# Patient Record
Sex: MALE | Race: WHITE | NOT HISPANIC OR LATINO | Employment: FULL TIME | URBAN - NONMETROPOLITAN AREA
[De-identification: names, ages, dates, MRNs, and addresses within clinical notes are randomized per-mention and may not be internally consistent; named-entity substitution may affect disease eponyms.]

---

## 2018-07-09 ENCOUNTER — HOSPITAL ENCOUNTER (OUTPATIENT)
Facility: HOSPITAL | Age: 33
Setting detail: OBSERVATION
Discharge: HOME | End: 2018-07-10
Attending: INTERNAL MEDICINE | Admitting: INTERNAL MEDICINE

## 2018-07-09 DIAGNOSIS — I48.91 ATRIAL FIBRILLATION, UNSPECIFIED TYPE (HCC): ICD-10-CM

## 2018-07-09 DIAGNOSIS — G47.33 OSA (OBSTRUCTIVE SLEEP APNEA): Primary | ICD-10-CM

## 2018-07-09 LAB
CHOLEST SERPL-MCNC: 130 MG/DL (ref 0–199)
HBA1C MFR BLD: 5.7 % (ref 3–6)
HDLC SERPL-MCNC: 32 MG/DL (ref 40–60)
HIGH SENSITIVE C-REACTIVE PROTEIN MG/L: 12.03 MG/L (ref 1–3)
LDLC SERPL CALC-MCNC: 78 MG/DL (ref 0–99)
LDLC/HDLC SERPL: 2.45 {RATIO}
T4 FREE SERPL-MCNC: 1.5 NG/DL (ref 0.78–2.19)
TRIGL SERPL-MCNC: 98 MG/DL
TSH SERPL DL<=0.05 MIU/L-ACNC: 1.84 MIU/ML (ref 0.47–4.68)
VLDLC SERPL-MCNC: 19.6 MG/DL

## 2018-07-09 PROCEDURE — 25010000002 MIDAZOLAM PER 1 MG: Performed by: INTERNAL MEDICINE

## 2018-07-09 PROCEDURE — 93005 ELECTROCARDIOGRAM TRACING: CPT | Performed by: INTERNAL MEDICINE

## 2018-07-09 PROCEDURE — 83036 HEMOGLOBIN GLYCOSYLATED A1C: CPT | Performed by: INTERNAL MEDICINE

## 2018-07-09 PROCEDURE — 84443 ASSAY THYROID STIM HORMONE: CPT | Performed by: INTERNAL MEDICINE

## 2018-07-09 PROCEDURE — 94660 CPAP INITIATION&MGMT: CPT

## 2018-07-09 PROCEDURE — G0379 DIRECT REFER HOSPITAL OBSERV: HCPCS

## 2018-07-09 PROCEDURE — 94799 UNLISTED PULMONARY SVC/PX: CPT

## 2018-07-09 PROCEDURE — 84439 ASSAY OF FREE THYROXINE: CPT | Performed by: INTERNAL MEDICINE

## 2018-07-09 PROCEDURE — G0378 HOSPITAL OBSERVATION PER HR: HCPCS

## 2018-07-09 PROCEDURE — 80061 LIPID PANEL: CPT | Performed by: INTERNAL MEDICINE

## 2018-07-09 PROCEDURE — 25010000002 MAGNESIUM SULFATE 2 GM/50ML SOLUTION: Performed by: INTERNAL MEDICINE

## 2018-07-09 PROCEDURE — 86141 C-REACTIVE PROTEIN HS: CPT | Performed by: INTERNAL MEDICINE

## 2018-07-09 RX ORDER — METOPROLOL TARTRATE 5 MG/5ML
INJECTION INTRAVENOUS EVERY 6 HOURS
Status: CANCELLED | OUTPATIENT
Start: 2018-07-09

## 2018-07-09 RX ORDER — SPIRONOLACTONE 25 MG/1
25 TABLET ORAL DAILY
Status: DISCONTINUED | OUTPATIENT
Start: 2018-07-09 | End: 2018-07-10 | Stop reason: HOSPADM

## 2018-07-09 RX ORDER — LISINOPRIL 10 MG/1
10 TABLET ORAL
Status: DISCONTINUED | OUTPATIENT
Start: 2018-07-09 | End: 2018-07-10 | Stop reason: HOSPADM

## 2018-07-09 RX ORDER — MAGNESIUM SULFATE HEPTAHYDRATE 40 MG/ML
2 INJECTION, SOLUTION INTRAVENOUS ONCE
Status: COMPLETED | OUTPATIENT
Start: 2018-07-09 | End: 2018-07-09

## 2018-07-09 RX ORDER — LOSARTAN POTASSIUM 25 MG/1
25 TABLET ORAL DAILY
COMMUNITY
End: 2018-07-10 | Stop reason: HOSPADM

## 2018-07-09 RX ORDER — ESOMEPRAZOLE MAGNESIUM 40 MG/1
40 CAPSULE, DELAYED RELEASE ORAL
COMMUNITY

## 2018-07-09 RX ORDER — MIDAZOLAM HYDROCHLORIDE 1 MG/ML
5 INJECTION INTRAMUSCULAR; INTRAVENOUS ONCE
Status: COMPLETED | OUTPATIENT
Start: 2018-07-09 | End: 2018-07-09

## 2018-07-09 RX ORDER — METOPROLOL SUCCINATE 50 MG/1
50 TABLET, EXTENDED RELEASE ORAL
Status: DISCONTINUED | OUTPATIENT
Start: 2018-07-09 | End: 2018-07-10 | Stop reason: HOSPADM

## 2018-07-09 RX ORDER — METOPROLOL TARTRATE 5 MG/5ML
5 INJECTION INTRAVENOUS ONCE
Status: COMPLETED | OUTPATIENT
Start: 2018-07-09 | End: 2018-07-09

## 2018-07-09 RX ORDER — MEPERIDINE HYDROCHLORIDE 50 MG/ML
50 INJECTION INTRAMUSCULAR; INTRAVENOUS; SUBCUTANEOUS ONCE
Status: COMPLETED | OUTPATIENT
Start: 2018-07-09 | End: 2018-07-09

## 2018-07-09 RX ADMIN — METOPROLOL SUCCINATE 50 MG: 50 TABLET, EXTENDED RELEASE ORAL at 20:16

## 2018-07-09 RX ADMIN — METOROPROLOL TARTRATE 5 MG: 5 INJECTION, SOLUTION INTRAVENOUS at 18:25

## 2018-07-09 RX ADMIN — LISINOPRIL 10 MG: 10 TABLET ORAL at 20:16

## 2018-07-09 RX ADMIN — SPIRONOLACTONE 25 MG: 25 TABLET ORAL at 20:16

## 2018-07-09 RX ADMIN — MAGNESIUM SULFATE HEPTAHYDRATE 2 G: 40 INJECTION, SOLUTION INTRAVENOUS at 18:33

## 2018-07-09 RX ADMIN — MEPERIDINE HYDROCHLORIDE 50 MG: 50 INJECTION, SOLUTION INTRAMUSCULAR; INTRAVENOUS; SUBCUTANEOUS at 18:37

## 2018-07-09 RX ADMIN — METOROPROLOL TARTRATE 5 MG: 5 INJECTION, SOLUTION INTRAVENOUS at 18:33

## 2018-07-09 RX ADMIN — MIDAZOLAM HYDROCHLORIDE 5 MG: 1 INJECTION, SOLUTION INTRAMUSCULAR; INTRAVENOUS at 18:36

## 2018-07-09 NOTE — H&P
Arslan Garcia MD      Patient Care Team:  Arslan Garcia MD as PCP - General (Family Medicine)      History of present illness:    General gentleman who is been feeling weak for the last 2-3 weeks time.  Has been short of breath in general.  Has no energy to do things.  Yesterday started feeling severe chest pains which were intermittent in nature this morning the pains got very severe hence came into the emergency room at [facility].  There he was noted to rule out for myocardial infarction nevertheless what is in atrial flutter with rapid ventricular rate.  He did not respond to Cardizem therapy as well as beta blocker therapy was put on amiodarone drip and transferred over.    Review of Systems   Pertinent items are noted in HPI  Review of Systems      History  #1 baseline EKG sinus rhythm -  msecs rate 64 bpm no st changes   #2 Htn   #3 Obesity morbid  #4 Sleep apnea high probability - snores a lot     Personal history :   Non smoker . No alcohol intake . No drugs     ROS:   No cough cold fever chills nausea vomiting diarrhea and abdominal pain loss of consciousness PND orthopnea stroke weakness joint swelling distal to the symptoms are negative.    Family history:    Noncontributory.    History reviewed. No pertinent surgical history., No family history on file., Social History   Substance Use Topics   • Smoking status: Never Smoker   • Smokeless tobacco: Never Used   • Alcohol use Not on file   , Prescriptions Prior to Admission   Medication Sig Dispense Refill Last Dose   • esomeprazole (nexIUM) 40 MG capsule Take 40 mg by mouth Every Morning Before Breakfast.   Past Week at Unknown time   • losartan (COZAAR) 25 MG tablet Take 25 mg by mouth Daily.   More than a month at Unknown time   , Scheduled Meds:    lisinopril 10 mg Oral Q24H   metoprolol succinate XL 50 mg Oral Q24H   spironolactone 25 mg Oral Daily   , Continuous Infusions:   , PRN Meds:  , Allergies:  Patient has no known  "allergies.     Objective     Vital Sign Min/Max for last 24 hours  Temp  Min: 97.6 °F (36.4 °C)  Max: 97.6 °F (36.4 °C)   BP  Min: 101/61  Max: 130/98   Pulse  Min: 128  Max: 133   Resp  Min: 14  Max: 14   SpO2  Min: 92 %  Max: 100 %   Flow (L/min)  Min: 2  Max: 2   Weight  Min: 231 kg (509 lb 4.8 oz)  Max: 231 kg (509 lb 4.8 oz)     Flowsheet Rows      First Filed Value   Admission Height  188 cm (74\") Documented at 07/09/2018 1500   Admission Weight   231 kg (509 lb 4.8 oz) Documented at 07/09/2018 1500               Physical Exam:     General Appearance:    Alert, cooperative, in no acute distress   Head:    Normocephalic, without obvious abnormality, atraumatic   Eyes:            Lids and lashes normal, conjunctivae and sclerae normal, no   icterus, no pallor, corneas clear, PERRLA   Ears:    Ears appear intact with no abnormalities noted   Throat:   No oral lesions, no thrush, oral mucosa moist   Neck:   No adenopathy, supple, trachea midline, no thyromegaly, no   carotid bruit, no JVD   Back:     No kyphosis present, no scoliosis present, no skin lesions,      erythema or scars, no tenderness to percussion or                   palpation,   range of motion normal   Lungs:     Clear to auscultation,respirations regular, even and                  unlabored    Heart:    Regular rhythm and normal rate, normal S1 and S2, no            murmur, no gallop, no rub, no click   Chest Wall:    No abnormalities observed   Abdomen:     Normal bowel sounds, no masses, no organomegaly, soft        non-tender, non-distended, no guarding, no rebound                tenderness   Rectal:     Deferred   Extremities:   Moves all extremities well, no edema, no cyanosis, no             redness   Pulses:   Pulses palpable and equal bilaterally   Skin:   No bleeding, bruising or rash   Lymph nodes:   No palpable adenopathy   Neurologic:   Cranial nerves 2 - 12 grossly intact, sensation intact, DTR       present and equal bilaterally "       Results Review:   I reviewed the patient's new clinical results.  EKG atrial flutter with rapid ventricular rate.    LAB DATA :           No results found for: WBC, RBC, HGB, HCT, MCV, MCH, MCHC, RDW, RDWSD, MPV, PLT, NEUTRORELPCT, LYMPHORELPCT, MONORELPCT, EOSRELPCT, BASORELPCT, AUTOIGPER, NEUTROABS, LYMPHSABS, MONOSABS, EOSABS, BASOSABS, AUTOIGNUM, NRBC    No results found for: GLUCOSE, BUN, CREATININE, EGFRIFNONA, EGFRIFAFRI, BCR, CO2, CALCIUM, PROTENTOTREF, ALBUMIN, LABIL2, AST, ALT    No results found for: CKTOTAL, CKMB, CKMBINDEX, TROPONINI, TROPONINT    No results found for: DDIMER    No results found for: SITE, ALLENTEST, PHART, TRX8HCU, PO2ART, YGR1GLB, BASEEXCESS, F5RWLVOB, HGBBG, HCTABG, OXYHEMOGLOBI, METHHGBN, CARBOXYHGB, CO2CT, BAROMETRIC, MODALITY, FIO2  No results found for: HGBA1C      No results found for: LIPASE    IMAGING DATA:     No results found.    Assessment/Plan     DIAGNOSIS   #1 atrial flutter with rapid ventricular rate: Patient is presented with atrial flutter with rapid ventricular rate.  He has failed beta blockers and calcium channel blockers therapy at this time.  We will proceed with a FREEMAN guided cardioversion.  This in view of the fact that the patient has had symptoms for over 2 weeks now.  And a bedside echo reveals diminished LV systolic function.    #2 anticoagulation therapy: Patient's chads 2 score appears to be one.  Will obtain blood work to evaluate for any other risk factors solid if any change in the chads score does occur.    #3 morbid obesity: Will need to be addressed.    #4 sleep apnea syndrome high probability.  We will arrange for a sleep study on an outpatient basis.    Estimated length of stay: One to 2 days.        Active Problems:    Atrial fibrillation (CMS/HCC)          I discussed the patients findings and my recommendations with patient    Duane Gracia MD  07/09/18  7:30 PM

## 2018-07-10 ENCOUNTER — APPOINTMENT (OUTPATIENT)
Dept: CARDIOLOGY | Facility: HOSPITAL | Age: 33
End: 2018-07-10
Attending: INTERNAL MEDICINE

## 2018-07-10 VITALS
DIASTOLIC BLOOD PRESSURE: 90 MMHG | WEIGHT: 315 LBS | RESPIRATION RATE: 18 BRPM | SYSTOLIC BLOOD PRESSURE: 138 MMHG | HEART RATE: 79 BPM | BODY MASS INDEX: 40.43 KG/M2 | TEMPERATURE: 98.5 F | OXYGEN SATURATION: 95 % | HEIGHT: 74 IN

## 2018-07-10 PROBLEM — I10 ESSENTIAL HYPERTENSION: Chronic | Status: ACTIVE | Noted: 2018-07-10

## 2018-07-10 PROBLEM — IMO0001 CLASS 3 OBESITY IN ADULT: Chronic | Status: ACTIVE | Noted: 2018-07-10

## 2018-07-10 PROBLEM — G47.37 CENTRAL SLEEP APNEA DUE TO MEDICAL CONDITION: Chronic | Status: ACTIVE | Noted: 2018-07-10

## 2018-07-10 LAB
ALBUMIN SERPL-MCNC: 3.9 G/DL (ref 3.5–5)
ALBUMIN/GLOB SERPL: 1.1 G/DL (ref 1–2)
ALP SERPL-CCNC: 68 U/L (ref 38–126)
ALT SERPL W P-5'-P-CCNC: 87 U/L (ref 13–69)
ANION GAP SERPL CALCULATED.3IONS-SCNC: 12.6 MMOL/L (ref 10–20)
AST SERPL-CCNC: 52 U/L (ref 15–46)
BASOPHILS # BLD AUTO: 0.07 10*3/MM3 (ref 0–0.2)
BASOPHILS NFR BLD AUTO: 0.8 % (ref 0–2.5)
BH CV ECHO MEAS - % IVS THICK: 47.8 %
BH CV ECHO MEAS - % LVPW THICK: 9.5 %
BH CV ECHO MEAS - AO ACC SLOPE: 898.3 CM/SEC^2
BH CV ECHO MEAS - AO ACC TIME: 0.12 SEC
BH CV ECHO MEAS - AO ROOT AREA (BSA CORRECTED): 1.1
BH CV ECHO MEAS - AO ROOT AREA: 9.3 CM^2
BH CV ECHO MEAS - AO ROOT DIAM: 3.4 CM
BH CV ECHO MEAS - BSA(HAYCOCK): 3.6 M^2
BH CV ECHO MEAS - BSA: 3.2 M^2
BH CV ECHO MEAS - BZI_BMI: 65.4 KILOGRAMS/M^2
BH CV ECHO MEAS - BZI_METRIC_HEIGHT: 188 CM
BH CV ECHO MEAS - BZI_METRIC_WEIGHT: 230.9 KG
BH CV ECHO MEAS - CONTRAST EF 4CH: 47.9 ML/M^2
BH CV ECHO MEAS - EDV(CUBED): 311.7 ML
BH CV ECHO MEAS - EDV(MOD-SP4): 140 ML
BH CV ECHO MEAS - EDV(TEICH): 237.7 ML
BH CV ECHO MEAS - EF(CUBED): 63 %
BH CV ECHO MEAS - EF(MOD-SP4): 47.9 %
BH CV ECHO MEAS - EF(TEICH): 53.3 %
BH CV ECHO MEAS - ESV(CUBED): 115.3 ML
BH CV ECHO MEAS - ESV(MOD-SP4): 73 ML
BH CV ECHO MEAS - ESV(TEICH): 111.1 ML
BH CV ECHO MEAS - FS: 28.2 %
BH CV ECHO MEAS - IVS/LVPW: 1.1
BH CV ECHO MEAS - IVSD: 1.3 CM
BH CV ECHO MEAS - IVSS: 1.9 CM
BH CV ECHO MEAS - LA DIMENSION: 5.5 CM
BH CV ECHO MEAS - LA/AO: 1.6
BH CV ECHO MEAS - LV DIASTOLIC VOL/BSA (35-75): 43.3 ML/M^2
BH CV ECHO MEAS - LV MASS(C)D: 401 GRAMS
BH CV ECHO MEAS - LV MASS(C)DI: 124.1 GRAMS/M^2
BH CV ECHO MEAS - LV MASS(C)S: 342.6 GRAMS
BH CV ECHO MEAS - LV MASS(C)SI: 106 GRAMS/M^2
BH CV ECHO MEAS - LV MAX PG: 2.5 MMHG
BH CV ECHO MEAS - LV MEAN PG: 1.8 MMHG
BH CV ECHO MEAS - LV SYSTOLIC VOL/BSA (12-30): 22.6 ML/M^2
BH CV ECHO MEAS - LV V1 MAX: 74.7 CM/SEC
BH CV ECHO MEAS - LV V1 MEAN: 56.4 CM/SEC
BH CV ECHO MEAS - LV V1 VTI: 18.2 CM
BH CV ECHO MEAS - LVIDD: 6.8 CM
BH CV ECHO MEAS - LVIDS: 4.9 CM
BH CV ECHO MEAS - LVLD AP4: 9.4 CM
BH CV ECHO MEAS - LVLS AP4: 7 CM
BH CV ECHO MEAS - LVOT AREA (M): 5.3 CM^2
BH CV ECHO MEAS - LVOT AREA: 5.3 CM^2
BH CV ECHO MEAS - LVOT DIAM: 2.6 CM
BH CV ECHO MEAS - LVPWD: 1.2 CM
BH CV ECHO MEAS - LVPWS: 1.3 CM
BH CV ECHO MEAS - MV A MAX VEL: 52.8 CM/SEC
BH CV ECHO MEAS - MV E MAX VEL: 115.5 CM/SEC
BH CV ECHO MEAS - MV E/A: 2.2
BH CV ECHO MEAS - MV MAX PG: 4.9 MMHG
BH CV ECHO MEAS - MV MEAN PG: 1.3 MMHG
BH CV ECHO MEAS - MV V2 MAX: 110.6 CM/SEC
BH CV ECHO MEAS - MV V2 MEAN: 49 CM/SEC
BH CV ECHO MEAS - MV V2 VTI: 19 CM
BH CV ECHO MEAS - MVA(VTI): 5.1 CM^2
BH CV ECHO MEAS - PA ACC SLOPE: 652.6 CM/SEC^2
BH CV ECHO MEAS - PA ACC TIME: 0.1 SEC
BH CV ECHO MEAS - PA MAX PG: 1.5 MMHG
BH CV ECHO MEAS - PA MEAN PG: 0.82 MMHG
BH CV ECHO MEAS - PA PR(ACCEL): 33.1 MMHG
BH CV ECHO MEAS - PA V2 MAX: 60.2 CM/SEC
BH CV ECHO MEAS - PA V2 MEAN: 42.8 CM/SEC
BH CV ECHO MEAS - PA V2 VTI: 14.2 CM
BH CV ECHO MEAS - SI(CUBED): 60.8 ML/M^2
BH CV ECHO MEAS - SI(LVOT): 30.1 ML/M^2
BH CV ECHO MEAS - SI(MOD-SP4): 20.7 ML/M^2
BH CV ECHO MEAS - SI(TEICH): 39.2 ML/M^2
BH CV ECHO MEAS - SV(CUBED): 196.4 ML
BH CV ECHO MEAS - SV(LVOT): 97.3 ML
BH CV ECHO MEAS - SV(MOD-SP4): 67 ML
BH CV ECHO MEAS - SV(TEICH): 126.6 ML
BH CV ECHO MEAS - TR MAX VEL: 209.7 CM/SEC
BILIRUB SERPL-MCNC: 1.1 MG/DL (ref 0.2–1.3)
BUN BLD-MCNC: 21 MG/DL (ref 7–20)
BUN/CREAT SERPL: 17.5 (ref 6.3–21.9)
CALCIUM SPEC-SCNC: 9.5 MG/DL (ref 8.4–10.2)
CHLORIDE SERPL-SCNC: 103 MMOL/L (ref 98–107)
CO2 SERPL-SCNC: 30 MMOL/L (ref 26–30)
CREAT BLD-MCNC: 1.2 MG/DL (ref 0.6–1.3)
DEPRECATED RDW RBC AUTO: 47.3 FL (ref 37–54)
EOSINOPHIL # BLD AUTO: 0.26 10*3/MM3 (ref 0–0.7)
EOSINOPHIL NFR BLD AUTO: 2.8 % (ref 0–7)
ERYTHROCYTE [DISTWIDTH] IN BLOOD BY AUTOMATED COUNT: 14.2 % (ref 11.5–14.5)
GFR SERPL CREATININE-BSD FRML MDRD: 71 ML/MIN/1.73
GLOBULIN UR ELPH-MCNC: 3.5 GM/DL
GLUCOSE BLD-MCNC: 115 MG/DL (ref 74–98)
HCT VFR BLD AUTO: 45 % (ref 42–52)
HGB BLD-MCNC: 14.1 G/DL (ref 14–18)
IMM GRANULOCYTES # BLD: 0.05 10*3/MM3 (ref 0–0.06)
IMM GRANULOCYTES NFR BLD: 0.5 % (ref 0–0.6)
LYMPHOCYTES # BLD AUTO: 2.25 10*3/MM3 (ref 0.6–3.4)
LYMPHOCYTES NFR BLD AUTO: 24.5 % (ref 10–50)
MAXIMAL PREDICTED HEART RATE: 189 BPM
MCH RBC QN AUTO: 28.4 PG (ref 27–31)
MCHC RBC AUTO-ENTMCNC: 31.3 G/DL (ref 30–37)
MCV RBC AUTO: 90.5 FL (ref 80–94)
MONOCYTES # BLD AUTO: 0.94 10*3/MM3 (ref 0–0.9)
MONOCYTES NFR BLD AUTO: 10.3 % (ref 0–12)
NEUTROPHILS # BLD AUTO: 5.6 10*3/MM3 (ref 2–6.9)
NEUTROPHILS NFR BLD AUTO: 61.1 % (ref 37–80)
NRBC BLD MANUAL-RTO: 0 /100 WBC (ref 0–0)
PLATELET # BLD AUTO: 403 10*3/MM3 (ref 130–400)
PMV BLD AUTO: 10.3 FL (ref 6–12)
POTASSIUM BLD-SCNC: 4.6 MMOL/L (ref 3.5–5.1)
PROT SERPL-MCNC: 7.4 G/DL (ref 6.3–8.2)
RBC # BLD AUTO: 4.97 10*6/MM3 (ref 4.7–6.1)
SODIUM BLD-SCNC: 141 MMOL/L (ref 137–145)
STRESS TARGET HR: 161 BPM
WBC NRBC COR # BLD: 9.17 10*3/MM3 (ref 4.8–10.8)

## 2018-07-10 PROCEDURE — 94799 UNLISTED PULMONARY SVC/PX: CPT

## 2018-07-10 PROCEDURE — 85025 COMPLETE CBC W/AUTO DIFF WBC: CPT | Performed by: INTERNAL MEDICINE

## 2018-07-10 PROCEDURE — 99244 OFF/OP CNSLTJ NEW/EST MOD 40: CPT | Performed by: INTERNAL MEDICINE

## 2018-07-10 PROCEDURE — 93306 TTE W/DOPPLER COMPLETE: CPT

## 2018-07-10 PROCEDURE — 93320 DOPPLER ECHO COMPLETE: CPT

## 2018-07-10 PROCEDURE — 93325 DOPPLER ECHO COLOR FLOW MAPG: CPT

## 2018-07-10 PROCEDURE — G0378 HOSPITAL OBSERVATION PER HR: HCPCS

## 2018-07-10 PROCEDURE — 94660 CPAP INITIATION&MGMT: CPT

## 2018-07-10 PROCEDURE — 93312 ECHO TRANSESOPHAGEAL: CPT

## 2018-07-10 PROCEDURE — 80053 COMPREHEN METABOLIC PANEL: CPT | Performed by: INTERNAL MEDICINE

## 2018-07-10 RX ORDER — METOPROLOL SUCCINATE 50 MG/1
50 TABLET, EXTENDED RELEASE ORAL
Qty: 90 TABLET | Refills: 3 | Status: SHIPPED | OUTPATIENT
Start: 2018-07-11

## 2018-07-10 RX ORDER — SPIRONOLACTONE 25 MG/1
25 TABLET ORAL DAILY
Qty: 90 TABLET | Refills: 3 | Status: SHIPPED | OUTPATIENT
Start: 2018-07-11

## 2018-07-10 RX ORDER — LISINOPRIL 10 MG/1
10 TABLET ORAL 2 TIMES DAILY
Qty: 180 TABLET | Refills: 3 | Status: SHIPPED | OUTPATIENT
Start: 2018-07-10 | End: 2022-07-27 | Stop reason: DRUGHIGH

## 2018-07-10 RX ADMIN — METOPROLOL SUCCINATE 50 MG: 50 TABLET, EXTENDED RELEASE ORAL at 08:50

## 2018-07-10 RX ADMIN — LISINOPRIL 10 MG: 10 TABLET ORAL at 08:50

## 2018-07-10 RX ADMIN — SPIRONOLACTONE 25 MG: 25 TABLET ORAL at 08:49

## 2018-07-10 NOTE — PROCEDURES
Procedure performed:    Electrical cardioversion.    Date of procedure 7/9/18.    Indication: Incessant atrial flutter with rapid ventricular rate symptomatic.    Procedure:    Patient underwent a transesophageal echocardiography.  There was no evidence for thrombus in the left atrial appendage.  Patient was shocked a single sinus rhythm with a single synchronized shock at 200 J.    Conclusions:    Successful cardioversion of atrial flutter to sinus rhythm with a single synchronized electric shock.

## 2018-07-10 NOTE — PROGRESS NOTES
Discharge Planning Assessment  [facility]     Patient Name: Baldemar Schmitt  MRN: [id number]  Today's Date: 7/10/2018    Admit Date: 7/9/2018          Discharge Needs Assessment     Row Name 07/10/18 1015       Living Environment    Lives With spouse    Current Living Arrangements home/apartment/condo    Primary Care Provided by self    Provides Primary Care For no one    Family Caregiver if Needed spouse    Quality of Family Relationships helpful;involved;supportive    Able to Return to Prior Arrangements yes       Resource/Environmental Concerns    Resource/Environmental Concerns none       Transition Planning    Patient/Family Anticipates Transition to home with family    Patient/Family Anticipated Services at Transition none    Transportation Anticipated car, drives self;family or friend will provide       Discharge Needs Assessment    Readmission Within the Last 30 Days no previous admission in last 30 days    Concerns to be Addressed no discharge needs identified;denies needs/concerns at this time    Equipment Currently Used at Home none    Anticipated Changes Related to Illness none    Equipment Needed After Discharge none    Offered/Gave Vendor List no            Discharge Plan     Row Name 07/10/18 1016       Plan    Plan Discharge Planning    Patient/Family in Agreement with Plan yes    Plan Comments SW met with pt at bedside while in the ICU for dcp. Pt lives with his wife and is independent with ADL's. Pt is employed full time. He has a PCP that he follows. He does not have an AD and info was offered. Pt does not use any home medical equipment. Pt denies any d/c needs at this time and plans on returning home later today. SW will continue to follow and assist as needed.    Final Discharge Disposition Code 01 - home or self-care        Destination     No service coordination in this encounter.      Durable Medical Equipment     No service coordination in this encounter.      Dialysis/Infusion     No service  coordination in this encounter.      Home Medical Care     No service coordination in this encounter.      Social Care     No service coordination in this encounter.                Demographic Summary    No documentation.           Functional Status     Row Name 07/10/18 1014       Functional Status    Usual Activity Tolerance good    Current Activity Tolerance good       Functional Status, IADL    Medications independent    Meal Preparation independent    Housekeeping independent    Laundry independent    Shopping independent       Mental Status Summary    Recent Changes in Mental Status/Cognitive Functioning no changes       Employment/    Employment Status employed full time    Current or Previous Occupation professional;service industry    Employment/ Comments works at [employer] ([employer]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           Abuse/Neglect    No documentation.           Legal    No documentation.           Substance Abuse    No documentation.           Patient Forms    No documentation.         KELLI Sanchez  07/10/18  10:20 AM

## 2018-07-10 NOTE — PROGRESS NOTES
Case Management Discharge Note    Final Note: Pt to d/c home later today. No needs identified.    Destination     No service has been selected for the patient.      Durable Medical Equipment     No service has been selected for the patient.      Dialysis/Infusion     No service has been selected for the patient.      Home Medical Care     No service has been selected for the patient.      Social Care     No service has been selected for the patient.        Other: Other (personal vehicle)    Final Discharge Disposition Code: 01 - home or self-care

## 2018-07-10 NOTE — PLAN OF CARE
Problem: Arrhythmia/Dysrhythmia (Symptomatic) (Adult)  Goal: Signs and Symptoms of Listed Potential Problems Will be Absent, Minimized or Managed (Arrhythmia/Dysrhythmia)  Outcome: Ongoing (interventions implemented as appropriate)   07/10/18 3360   Goal/Outcome Evaluation   Problems Assessed (Arrhythmia/Dysrhythmia) all   Problems Present (Dysrhythmia) none

## 2018-07-10 NOTE — DISCHARGE INSTR - APPOINTMENTS
Dr Arslan Garcia - [phone] (follow-up in 2 weeks)  July 23rd, 2018 @ 10 AM    Dr Gracia [phone] [address] (follow-up 4 weeks)  August 13th @ 3:45 PM  * Remember to bring Blood Pressure log and medications you are currently taking    Dr Guillermo - Pulmonologist [phone]  *If office has not called you within 1-2 weeks, call to ask about home sleep apnea study

## 2018-07-10 NOTE — CONSULTS
"Date of consultation:   July 10, 2018    Requested by:   Dr. Gracia     PCP: Arslan Garcia MD    Reason:  Obstructive sleep apnea    History of Present Illness:  32 y.o. male  who presented with atrial fibrillation/flutter and had to be cardioverted.  Upon questioning he was complaining of sleep disturbance and consultation was requested for further recommendations. Patient says that for the past few years, he has had trouble with snoring. Patient says that he feels tired in the morning after waking up. He is also complaining of occasionally falling asleep while watching TV.    Patient's sleep schedule was reviewed. He drinks 4-6 cups/cans of caffeinated drinks per day.     Patient is under management for hypertension, although does not take any medicines for it      The patient is a nonsmoker.  He also denies any known family history of sleep apnea.    Review of System:  All other review of systems negative except indicated in HPI   Past Medical History:  Past Medical History:   Diagnosis Date   • Hypertension          Past Surgical History:  History reviewed. No pertinent surgical history.      Family History:  No family history on file.      Social History:  Social History     Social History   • Marital status:      Social History Main Topics   • Smoking status: Never Smoker   • Smokeless tobacco: Never Used   • Drug use: Unknown     Other Topics Concern   • Not on file         Physical Exam:  /79   Pulse 75   Temp 98.3 °F (36.8 °C) (Axillary)   Resp 16   Ht 188 cm (74\")   Wt (!) 231 kg (509 lb 4.8 oz)   SpO2 96%   BMI 65.39 kg/m²     Constitutional:           General: No acute distress noted. Able to communicate appropriately           Body Habitus: Obese .    Head/Face/Eyes:            Normocephalic. No significant facial abnormalities seen.            Extra ocular movement was intact.            Pupils appeared equal            Conjunctivae: Normal    ENT:           Missing teeth noted.    "         Mallempati III/IV. Crowded oropharynx.            Tonsils were enlarged.    Neck:           Increased adipose tissue noted. No Jugular Venous Distention appreciated.     Cardiovascular:            S1 + S2. Regular.    Respiratory:           Respiratory effort was adequate             Percussion not performed.           No crackles heard.           No wheezing was auscultated.    Abdomen:            Obese.             Bowel sounds positive.   No obvious organomegaly.    Musculoskeletal/Extremities:             Gait could not be assessed.              No clubbing, cyanosis noted in the upper extremities.             Trace edema noted in the lower extremities bilaterally.    Neurologic/Psychiatric:             Affect appeared fair.              Awake, alert and oriented x 3.             Able to follow simple commands.    Skin:             No obvious rash noted.             Warm and dry         Labs:   Reviewed. Pertinent labs were noted.     Lab Results   Component Value Date    WBC 9.17 07/10/2018    HGB 14.1 07/10/2018    HCT 45.0 07/10/2018    MCV 90.5 07/10/2018     (H) 07/10/2018       Lab Results   Component Value Date    GLUCOSE 115 (H) 07/10/2018    CALCIUM 9.5 07/10/2018     07/10/2018    K 4.6 07/10/2018    CO2 30.0 07/10/2018     07/10/2018    BUN 21 (H) 07/10/2018    CREATININE 1.20 07/10/2018    EGFRIFNONA 71 07/10/2018    BCR 17.5 07/10/2018    ANIONGAP 12.6 07/10/2018           Assessment:  1.  Obstructive sleep apnea  2.  Excessive daytime sleepiness  3.  Atrial flutter/fibrillation  4.  Morbid obesity    Discussion/Recommendations:   His symptoms and the fact that the carbon dioxide level was elevated at 30, does suggest the likelihood of obstructive sleep apnea.    Sleep questionnaire will be provided to the patient    The pathophysiology of sleep apnea was discussed, with the patient.     We will encourage the patient to schedule the sleep study soon.     The patient was  made aware of the limitation of the home sleep study, whereby it may underestimate the true AHI and also carries a low sensitivity.  The patient was also told that even if the home sleep study is negative, we may suggest an in lab sleep study to completely and definitively rule out/in sleep apnea.  The patient has understood.  This was communicated to the patient, in case home study is to be requested.    The patient is agreeable to try CPAP/BiPAP, if needed.     Patient was educated on good sleep hygiene measures and voiced understanding of the same.     Patient was counseled regarding weight loss.     He actually has been thinking about possible bariatric surgery.    The plan was discussed with the patient.  I have also discussed the case with the nursing staff.    Recommendations were also discussed with the referring provider.     I would like to thank you for the opportunity to participate in the care of this patient.  We will communicate changes and recommendations, if and when necessary.      Bhumi Guillermo MD  07/10/18  9:04 AM    Dictated utilizing Dragon dictation.

## 2018-07-10 NOTE — DISCHARGE SUMMARY
Date of Discharge:  7/10/2018    Discharge Diagnosis:   1 atrial flutter status post electrical cardioversion     #2 hypertension with hypertensive heart disease.    #3 cor pulmonale-dilated right heart with no evidence for pulmonary hypertension.    #4 morbid obesity.    #5 sleep apnea syndrome evaluation pending.      Hospital Course  Patient is a 32 y.o. male presented with atrial flutter with rapid ventricular rate.  This was refractory to beta blockers and calcium channel blocker therapy.  Patient went underwent a FREEMAN guided cardioversion subsequently has maintained sinus rhythm.  Has been on beta blocker therapy.  Will treat his primary issues.  If any further recurrence will be referred for electrophysiology evaluation with intervention.    Patient blood pressure seems to be elevated.  Has been started on beta blocker With ACE inhibitor and Aldactone therapy.  Needs to check his blood pressures at home and bring the readings seen on follow-up.    His LV function appears to be borderline with left ventricle hypertrophy most probably secondary to hypertension.    Has severely dilated right ventricle with borderline RV function most probably this is related to his weight and his sleep apnea syndrome.  He is going to work on both of these issues.    Patient is morbidly obese is planning on weight loss surgery.  We will try to arrange for an outpatient appointment for the same on follow-up.    His story is very suggestive of sleep apnea syndrome.  Dr. Guillermo was requested on consult who has seen him and will be arranging for home sleep study and further follow-up after that.    His risk profile evaluation reveals no evidence for diabetes has an HDL that is mildly limited.  Needs to increase his activity level and try to exercise on a regular basis.  Procedures Performed    07/10 8912 Note By: Duane Gracia MD    Consults:   Consults     Date and Time Order Name Status Description    7/10/2018 0723  Inpatient Pulmonology Consult Completed               Condition on Discharge:  Stable     Vital Signs  Temp:  [97.6 °F (36.4 °C)-98.5 °F (36.9 °C)] 98.5 °F (36.9 °C)  Heart Rate:  [] 91  Resp:  [10-20] 18  BP: (101-154)/() 148/94  FiO2 (%):  [40 %] 40 %    Physical Exam:     General Appearance:    Alert, cooperative, in no acute distress   Head:    Normocephalic, without obvious abnormality, atraumatic   Eyes:            Lids and lashes normal, conjunctivae and sclerae normal, no   icterus, no pallor, corneas clear, PERRLA   Ears:    Ears appear intact with no abnormalities noted   Throat:   No oral lesions, no thrush, oral mucosa moist   Neck:   No adenopathy, supple, trachea midline, no thyromegaly, no   carotid bruit, no JVD   Back:     No kyphosis present, no scoliosis present, no skin lesions,      erythema or scars, no tenderness to percussion or                   palpation,   range of motion normal   Lungs:     Clear to auscultation,respirations regular, even and                  unlabored    Heart:    Regular rhythm and normal rate, normal S1 and S2, no            murmur, no gallop, no rub, no click   Chest Wall:    No abnormalities observed   Abdomen:     Normal bowel sounds, no masses, no organomegaly, soft        non-tender, non-distended, no guarding, no rebound                tenderness   Rectal:     Deferred   Extremities:   Moves all extremities well, no edema, no cyanosis, no             redness   Pulses:   Pulses palpable and equal bilaterally   Skin:   No bleeding, bruising or rash   Lymph nodes:   No palpable adenopathy   Neurologic:   Cranial nerves 2 - 12 grossly intact, sensation intact, DTR       present and equal bilaterally       LAB DATA :       Results from last 7 days  Lab Units 07/09/18  1905   CHOLESTEROL mg/dL 130   TRIGLYCERIDES mg/dL 98   HDL CHOL mg/dL 32*   LDL CHOL mg/dL 78       Laboratory results:      Results from last 7 days  Lab Units 07/10/18  0423   SODIUM  mmol/L 141   POTASSIUM mmol/L 4.6   CHLORIDE mmol/L 103   CO2 mmol/L 30.0   BUN mg/dL 21*   CREATININE mg/dL 1.20   CALCIUM mg/dL 9.5   BILIRUBIN mg/dL 1.1   ALK PHOS U/L 68   ALT (SGPT) U/L 87*   AST (SGOT) U/L 52*   GLUCOSE mg/dL 115*       Results from last 7 days  Lab Units 07/10/18  0423   WBC 10*3/mm3 9.17   HEMOGLOBIN g/dL 14.1   HEMATOCRIT % 45.0   PLATELETS 10*3/mm3 403*                                 Lab Results   Component Value Date    HGBA1C 5.7 07/09/2018       Results from last 7 days  Lab Units 07/09/18  1905   TSH mIU/mL 1.840   FREE T4 ng/dL 1.50           IMAGING DATA:     No results found.    Discharge Disposition  Home or Self Care    Discharge Medications     Discharge Medications      New Medications      Instructions Start Date   lisinopril 10 MG tablet  Commonly known as:  PRINIVIL,ZESTRIL   10 mg, Oral, 2 Times Daily      metoprolol succinate XL 50 MG 24 hr tablet  Commonly known as:  TOPROL-XL   50 mg, Oral, Every 24 Hours Scheduled   Start Date:  7/11/2018     spironolactone 25 MG tablet  Commonly known as:  ALDACTONE   25 mg, Oral, Daily   Start Date:  7/11/2018        Continue These Medications      Instructions Start Date   esomeprazole 40 MG capsule  Commonly known as:  nexIUM   40 mg, Oral, Every Morning Before Breakfast         Stop These Medications    losartan 25 MG tablet  Commonly known as:  COZAAR            Discharge Diet: cardiac     Activity at Discharge: as tolerated     Follow-up Appointments    Primary care  in 2 weeks.    Dr. Guillermo as scheduled.    Dr. Gracia in 4 weeks.  Needs to check bmp in one weeks time   Test Results Pending at Discharge       Duane Gracia MD  07/10/18  1:03 PM

## 2018-07-10 NOTE — NURSING NOTE
Pt time out for FREEMAN with cardioversion 1840 with  2mg versed and 25md demerl, procedure start 1843. 1mg versed 25mg demerol at 1849. 2mg versed at 1852 and cardioversion. Pt back in NSR

## 2018-07-18 ENCOUNTER — HOSPITAL ENCOUNTER (OUTPATIENT)
Dept: SLEEP MEDICINE | Facility: HOSPITAL | Age: 33
Discharge: HOME | End: 2018-07-18
Attending: INTERNAL MEDICINE | Admitting: INTERNAL MEDICINE

## 2018-07-18 DIAGNOSIS — I48.91 ATRIAL FIBRILLATION, UNSPECIFIED TYPE (HCC): ICD-10-CM

## 2018-07-18 DIAGNOSIS — G47.33 OSA (OBSTRUCTIVE SLEEP APNEA): ICD-10-CM

## 2018-07-18 PROCEDURE — 95806 SLEEP STUDY UNATT&RESP EFFT: CPT | Performed by: INTERNAL MEDICINE

## 2018-07-18 PROCEDURE — 95806 SLEEP STUDY UNATT&RESP EFFT: CPT

## 2018-07-25 DIAGNOSIS — G47.33 OSA (OBSTRUCTIVE SLEEP APNEA): Primary | ICD-10-CM

## 2018-10-18 ENCOUNTER — OFFICE VISIT (OUTPATIENT)
Dept: PULMONOLOGY | Facility: CLINIC | Age: 33
End: 2018-10-18

## 2018-10-18 VITALS
SYSTOLIC BLOOD PRESSURE: 119 MMHG | BODY MASS INDEX: 40.43 KG/M2 | RESPIRATION RATE: 18 BRPM | WEIGHT: 315 LBS | HEIGHT: 74 IN | HEART RATE: 87 BPM | OXYGEN SATURATION: 96 % | DIASTOLIC BLOOD PRESSURE: 62 MMHG

## 2018-10-18 DIAGNOSIS — G47.33 OSA (OBSTRUCTIVE SLEEP APNEA): Primary | ICD-10-CM

## 2018-10-18 DIAGNOSIS — G47.19 EXCESSIVE DAYTIME SLEEPINESS: ICD-10-CM

## 2018-10-18 PROCEDURE — 99214 OFFICE O/P EST MOD 30 MIN: CPT | Performed by: NURSE PRACTITIONER

## 2018-10-18 RX ORDER — ALLOPURINOL 100 MG/1
100 TABLET ORAL 2 TIMES DAILY
Refills: 2 | COMMUNITY
Start: 2018-10-04

## 2018-10-18 NOTE — PROGRESS NOTES
"Chief Complaint   Patient presents with   • Follow-up   • Sleeping Problem         Subjective   Baldemar Schmitt is a 33 y.o. male.     History of Present Illness   The patient comes in today for follow-up of obstructive sleep apnea.    I reviewed the patient's home sleep study results and discuss the results with him today.  The home sleep study results reveal severe obstructive sleep apnea with an apnea hypopnea index of 53 per hour.    He has been set up with AutoPap at a pressure of 8/20 with a full facemask.  He states that he has never felt this well rested.  He is using the CPAP machine every night.  He initially tried a full facemask but it was rubbing the bridge of his nose so he is now using an Anastasiya view full facemask.    The following portions of the patient's history were reviewed and updated as appropriate: allergies, current medications, past family history, past medical history, past social history and past surgical history.    Review of Systems   Constitutional: Negative for chills and fever.   HENT: Negative for sinus pressure and sore throat.    Respiratory: Negative for cough and shortness of breath.    Psychiatric/Behavioral: Negative for sleep disturbance.       Objective   Visit Vitals  /62   Pulse 87   Resp 18   Ht 188 cm (74.02\")   Wt (!) 199 kg (439 lb)   SpO2 96%   BMI 56.34 kg/m²     Physical Exam   Constitutional: He is oriented to person, place, and time. He appears well-developed and well-nourished.   HENT:   Head: Normocephalic and atraumatic.   Crowded oropharynx.    Eyes: EOM are normal.   Neck:   Increased adipose tissue.    Musculoskeletal:   Gait was normal.   Neurological: He is alert and oriented to person, place, and time.   Psychiatric: He has a normal mood and affect.   Vitals reviewed.          Assessment/Plan   Baldemar was seen today for follow-up and sleeping problem.    Diagnoses and all orders for this visit:    MAYRA (obstructive sleep apnea)  -     BIPAP / CPAP " Adjustment    Excessive daytime sleepiness           Return in about 5 months (around 3/18/2019) for Recheck, For Dr. Guillermo.    DISCUSSION (if any):  Reviewed discharge summary, labs and radiology.  He was hospitalized due to atrial fibrillation and had to be cardioverted.  Pulmonary was consulted on the patient due to the increased likelihood of the patient having obstructive sleep apnea.    He definitely seems to have benefited from AutoPap therapy. I have reviewed his compliance in detail with him.  He is compliant greater than 93%.  His average apnea hypopnea index is less than 3 per hour with use of the CPAP machine. I've encouraged him to continue using the machine every night.      Dictated utilizing Dragon dictation.    This document was electronically signed by MARIUM Hernandez October 18, 2018  2:54 PM

## 2019-03-19 ENCOUNTER — OFFICE VISIT (OUTPATIENT)
Dept: PULMONOLOGY | Facility: CLINIC | Age: 34
End: 2019-03-19

## 2019-03-19 VITALS
DIASTOLIC BLOOD PRESSURE: 76 MMHG | RESPIRATION RATE: 18 BRPM | OXYGEN SATURATION: 98 % | BODY MASS INDEX: 40.43 KG/M2 | HEIGHT: 74 IN | HEART RATE: 78 BPM | SYSTOLIC BLOOD PRESSURE: 128 MMHG | WEIGHT: 315 LBS

## 2019-03-19 DIAGNOSIS — G47.33 OBSTRUCTIVE SLEEP APNEA: Primary | ICD-10-CM

## 2019-03-19 DIAGNOSIS — E66.01 MORBID OBESITY, UNSPECIFIED OBESITY TYPE (HCC): ICD-10-CM

## 2019-03-19 PROCEDURE — 99213 OFFICE O/P EST LOW 20 MIN: CPT | Performed by: INTERNAL MEDICINE

## 2019-03-19 NOTE — PROGRESS NOTES
"Chief Complaint   Patient presents with   • Follow-up   • Sleeping Problem         Subjective   Baldemar Schmitt is a 33 y.o. male.     History of Present Illness   Patient comes back today for follow up of Sleep apnea.     Patient doesn't report any issues with the device or mask.     Patient says that the compliance with the use of the equipment is good.     Patient says that his symptoms of sleep disturbance & daytime sleepiness have been helped greatly with the use of PAP, as prescribed.       The following portions of the patient's history were reviewed and updated as appropriate: allergies, current medications, past family history, past medical history, past social history and past surgical history.    Review of Systems   HENT: Negative for sinus pressure, sneezing and sore throat.    Respiratory: Negative for cough, shortness of breath and wheezing.        Objective   Visit Vitals  /76   Pulse 78   Resp 18   Ht 188 cm (74.02\")   Wt (!) 188 kg (415 lb)   SpO2 98%   BMI 53.26 kg/m²       Physical Exam   Constitutional: He is oriented to person, place, and time. He appears well-developed and well-nourished.   HENT:   Head: Atraumatic.   Crowded oropharynx.    Musculoskeletal:   Gait was normal.   Neurological: He is alert and oriented to person, place, and time.   Psychiatric: He has a normal mood and affect.   Vitals reviewed.      Assessment/Plan   Baldemar was seen today for follow-up and sleeping problem.    Diagnoses and all orders for this visit:    Obstructive sleep apnea  -     BIPAP / CPAP Adjustment    Morbid obesity, unspecified obesity type (CMS/HCC)  -     BIPAP / CPAP Adjustment         Return in about 8 months (around 11/19/2019) for Recheck, Sleep/Thursday/Romina.    DISCUSSION (if any):  Continue treatment with CPAP at a pressure of 11, with a Anastasiya view full-face mask.    Patient seems to be compliant with PAP device, based on the available data and his account of improved symptoms.     Weight " loss advised. He will consider weight loss surgery and let us know. We will be happy to refer him to Bariatric surgery program.     The patient was once again reminded to continue using the PAP device regularly, every night for atleast 4 hours.      Dictated utilizing Dragon dictation.    This document was electronically signed by Bhumi Guillermo MD on 03/19/19 at 11:04 AM

## 2019-06-10 ENCOUNTER — HOSPITAL ENCOUNTER (EMERGENCY)
Facility: HOSPITAL | Age: 34
Discharge: HOME | End: 2019-06-10
Attending: EMERGENCY MEDICINE | Admitting: EMERGENCY MEDICINE

## 2019-06-10 ENCOUNTER — APPOINTMENT (OUTPATIENT)
Dept: GENERAL RADIOLOGY | Facility: HOSPITAL | Age: 34
End: 2019-06-10

## 2019-06-10 VITALS
SYSTOLIC BLOOD PRESSURE: 113 MMHG | OXYGEN SATURATION: 98 % | DIASTOLIC BLOOD PRESSURE: 63 MMHG | BODY MASS INDEX: 40.43 KG/M2 | TEMPERATURE: 98 F | HEART RATE: 65 BPM | RESPIRATION RATE: 18 BRPM | HEIGHT: 74 IN | WEIGHT: 315 LBS

## 2019-06-10 DIAGNOSIS — R07.9 CHEST PAIN, UNSPECIFIED TYPE: Primary | ICD-10-CM

## 2019-06-10 LAB
ALBUMIN SERPL-MCNC: 4.6 G/DL (ref 3.5–5)
ALBUMIN/GLOB SERPL: 1.2 G/DL (ref 1–2)
ALP SERPL-CCNC: 86 U/L (ref 38–126)
ALT SERPL W P-5'-P-CCNC: 33 U/L (ref 13–69)
ANION GAP SERPL CALCULATED.3IONS-SCNC: 14.7 MMOL/L (ref 10–20)
AST SERPL-CCNC: 24 U/L (ref 15–46)
BASOPHILS # BLD AUTO: 0.05 10*3/MM3 (ref 0–0.2)
BASOPHILS NFR BLD AUTO: 0.6 % (ref 0–1.5)
BILIRUB SERPL-MCNC: 0.8 MG/DL (ref 0.2–1.3)
BUN BLD-MCNC: 26 MG/DL (ref 7–20)
BUN/CREAT SERPL: 26 (ref 6.3–21.9)
CALCIUM SPEC-SCNC: 9.2 MG/DL (ref 8.4–10.2)
CHLORIDE SERPL-SCNC: 104 MMOL/L (ref 98–107)
CO2 SERPL-SCNC: 28 MMOL/L (ref 26–30)
CREAT BLD-MCNC: 1 MG/DL (ref 0.6–1.3)
DEPRECATED RDW RBC AUTO: 41.9 FL (ref 37–54)
EOSINOPHIL # BLD AUTO: 0.17 10*3/MM3 (ref 0–0.4)
EOSINOPHIL NFR BLD AUTO: 2.1 % (ref 0.3–6.2)
ERYTHROCYTE [DISTWIDTH] IN BLOOD BY AUTOMATED COUNT: 12.6 % (ref 12.3–15.4)
GFR SERPL CREATININE-BSD FRML MDRD: 87 ML/MIN/1.73
GLOBULIN UR ELPH-MCNC: 3.9 GM/DL
GLUCOSE BLD-MCNC: 104 MG/DL (ref 74–98)
HCT VFR BLD AUTO: 47.8 % (ref 37.5–51)
HGB BLD-MCNC: 15.4 G/DL (ref 13–17.7)
HOLD SPECIMEN: NORMAL
IMM GRANULOCYTES # BLD AUTO: 0.04 10*3/MM3 (ref 0–0.05)
IMM GRANULOCYTES NFR BLD AUTO: 0.5 % (ref 0–0.5)
LYMPHOCYTES # BLD AUTO: 2.03 10*3/MM3 (ref 0.7–3.1)
LYMPHOCYTES NFR BLD AUTO: 25.1 % (ref 19.6–45.3)
MCH RBC QN AUTO: 29.2 PG (ref 26.6–33)
MCHC RBC AUTO-ENTMCNC: 32.2 G/DL (ref 31.5–35.7)
MCV RBC AUTO: 90.5 FL (ref 79–97)
MONOCYTES # BLD AUTO: 0.65 10*3/MM3 (ref 0.1–0.9)
MONOCYTES NFR BLD AUTO: 8 % (ref 5–12)
NEUTROPHILS # BLD AUTO: 5.16 10*3/MM3 (ref 1.7–7)
NEUTROPHILS NFR BLD AUTO: 63.7 % (ref 42.7–76)
NRBC BLD AUTO-RTO: 0 /100 WBC (ref 0–0.2)
PLATELET # BLD AUTO: 381 10*3/MM3 (ref 140–450)
PMV BLD AUTO: 9.7 FL (ref 6–12)
POTASSIUM BLD-SCNC: 4.7 MMOL/L (ref 3.5–5.1)
PROT SERPL-MCNC: 8.5 G/DL (ref 6.3–8.2)
RBC # BLD AUTO: 5.28 10*6/MM3 (ref 4.14–5.8)
SODIUM BLD-SCNC: 142 MMOL/L (ref 137–145)
TROPONIN I SERPL-MCNC: <0.012 NG/ML (ref 0–0.03)
WBC NRBC COR # BLD: 8.1 10*3/MM3 (ref 3.4–10.8)
WHOLE BLOOD HOLD SPECIMEN: NORMAL
WHOLE BLOOD HOLD SPECIMEN: NORMAL

## 2019-06-10 PROCEDURE — 71046 X-RAY EXAM CHEST 2 VIEWS: CPT

## 2019-06-10 PROCEDURE — 85025 COMPLETE CBC W/AUTO DIFF WBC: CPT

## 2019-06-10 PROCEDURE — 84484 ASSAY OF TROPONIN QUANT: CPT

## 2019-06-10 PROCEDURE — 93005 ELECTROCARDIOGRAM TRACING: CPT

## 2019-06-10 PROCEDURE — 80053 COMPREHEN METABOLIC PANEL: CPT

## 2019-06-10 PROCEDURE — 99284 EMERGENCY DEPT VISIT MOD MDM: CPT

## 2019-06-10 RX ORDER — SODIUM CHLORIDE 0.9 % (FLUSH) 0.9 %
10 SYRINGE (ML) INJECTION AS NEEDED
Status: DISCONTINUED | OUTPATIENT
Start: 2019-06-10 | End: 2019-06-10 | Stop reason: HOSPADM

## 2019-06-10 RX ORDER — ASPIRIN 81 MG/1
324 TABLET, CHEWABLE ORAL ONCE
Status: COMPLETED | OUTPATIENT
Start: 2019-06-10 | End: 2019-06-10

## 2019-06-10 RX ADMIN — ASPIRIN 81 MG 324 MG: 81 TABLET ORAL at 10:17

## 2019-06-10 NOTE — ED PROVIDER NOTES
Subjective   33-year-old male presenting with chest pain.  He states that yesterday for about 2 hours he had some left-sided chest pain, this is a dull ache, did not radiate, no aggravating factors. Briefly alleviated by belching.  He assumed the pain was related to his heartburn.  Pain-free at this time.  He denies any fevers, chills, cough, nausea, vomiting, diaphoresis or shortness of breath.  He does have a family history of coronary disease.  He has a history of hypertension and sleep apnea.            Review of Systems   Constitutional: Negative.    HENT: Negative.    Eyes: Negative.    Respiratory: Negative.    Cardiovascular: Positive for chest pain.   Gastrointestinal: Negative.    Genitourinary: Negative.    Musculoskeletal: Negative.    Skin: Negative.    Neurological: Negative.    Psychiatric/Behavioral: Negative.        Past Medical History:   Diagnosis Date   • Hypertension        No Known Allergies    History reviewed. No pertinent surgical history.    History reviewed. No pertinent family history.    Social History     Socioeconomic History   • Marital status:      Spouse name: Not on file   • Number of children: Not on file   • Years of education: Not on file   • Highest education level: Not on file   Tobacco Use   • Smoking status: Never Smoker   • Smokeless tobacco: Never Used           Objective   Physical Exam   Constitutional: He is oriented to person, place, and time. He appears well-developed and well-nourished. No distress.   HENT:   Head: Normocephalic and atraumatic.   Right Ear: External ear normal.   Left Ear: External ear normal.   Nose: Nose normal.   Mouth/Throat: Oropharynx is clear and moist.   Eyes: Conjunctivae and EOM are normal. Pupils are equal, round, and reactive to light.   Neck: Normal range of motion. Neck supple.   Cardiovascular: Normal rate, regular rhythm, normal heart sounds and intact distal pulses.   Pulmonary/Chest: Effort normal and breath sounds normal. No  respiratory distress.   Abdominal: Soft. Bowel sounds are normal. He exhibits no distension. There is no tenderness. There is no rebound and no guarding.   Musculoskeletal: Normal range of motion. He exhibits no edema, tenderness or deformity.   Neurological: He is alert and oriented to person, place, and time.   Skin: Skin is warm and dry. No rash noted.   Psychiatric: He has a normal mood and affect. His behavior is normal.   Nursing note and vitals reviewed.      Procedures           ED Course              MDM  Number of Diagnoses or Management Options  Chest pain, unspecified type:   Diagnosis management comments: 33-year-old male with chest pain.  Well-developed, well-nourished morbidly obese young man in no distress with exam as above.  Will check labs, EKG and chest x-ray.  Will give aspirin.  He is pain-free at this time.  Disposition pending work-up.    DDX: GERD, ACS, anxiety    EKG interpreted by me: Sinus rhythm, normal rate, no acute ST/T changes, this is a normal EKG    Lab work is unremarkable.  Chest x-ray per radiology is without acute findings.  He has remained pain-free.  Will discharge home with outpatient follow-up.       Amount and/or Complexity of Data Reviewed  Clinical lab tests: reviewed  Tests in the radiology section of CPT®: reviewed          Final diagnoses:   Chest pain, unspecified type            Brant Dozier MD  06/10/19 1108

## 2022-07-27 ENCOUNTER — OFFICE VISIT (OUTPATIENT)
Dept: CARDIOLOGY | Facility: CLINIC | Age: 37
End: 2022-07-27

## 2022-07-27 VITALS
BODY MASS INDEX: 40.43 KG/M2 | HEIGHT: 74 IN | HEART RATE: 94 BPM | WEIGHT: 315 LBS | DIASTOLIC BLOOD PRESSURE: 88 MMHG | OXYGEN SATURATION: 98 % | SYSTOLIC BLOOD PRESSURE: 144 MMHG

## 2022-07-27 DIAGNOSIS — Z01.810 PRE-OPERATIVE CARDIOVASCULAR EXAMINATION: ICD-10-CM

## 2022-07-27 DIAGNOSIS — I51.7 CARDIOMEGALY: ICD-10-CM

## 2022-07-27 DIAGNOSIS — E66.01 MORBID OBESITY WITH BMI OF 60.0-69.9, ADULT: ICD-10-CM

## 2022-07-27 DIAGNOSIS — I48.0 PAROXYSMAL ATRIAL FIBRILLATION: ICD-10-CM

## 2022-07-27 DIAGNOSIS — I34.1 MITRAL VALVE PROLAPSE, AORTIC ENLARGEMENT, SKIN AND SKELETAL FINDINGS (MASS) SYNDROME: ICD-10-CM

## 2022-07-27 DIAGNOSIS — R06.02 SHORTNESS OF BREATH ON EXERTION: Primary | ICD-10-CM

## 2022-07-27 DIAGNOSIS — Q87.89 MITRAL VALVE PROLAPSE, AORTIC ENLARGEMENT, SKIN AND SKELETAL FINDINGS (MASS) SYNDROME: ICD-10-CM

## 2022-07-27 DIAGNOSIS — Q25.40 MITRAL VALVE PROLAPSE, AORTIC ENLARGEMENT, SKIN AND SKELETAL FINDINGS (MASS) SYNDROME: ICD-10-CM

## 2022-07-27 DIAGNOSIS — Q82.8 MITRAL VALVE PROLAPSE, AORTIC ENLARGEMENT, SKIN AND SKELETAL FINDINGS (MASS) SYNDROME: ICD-10-CM

## 2022-07-27 DIAGNOSIS — Q79.9 MITRAL VALVE PROLAPSE, AORTIC ENLARGEMENT, SKIN AND SKELETAL FINDINGS (MASS) SYNDROME: ICD-10-CM

## 2022-07-27 DIAGNOSIS — I10 PRIMARY HYPERTENSION: ICD-10-CM

## 2022-07-27 PROBLEM — E55.9 VITAMIN D DEFICIENCY: Status: ACTIVE | Noted: 2017-07-21

## 2022-07-27 PROBLEM — M10.9 PODAGRA: Status: ACTIVE | Noted: 2022-07-27

## 2022-07-27 PROBLEM — M25.569 KNEE PAIN: Status: ACTIVE | Noted: 2017-01-02

## 2022-07-27 PROBLEM — G47.30 SLEEP APNEA: Status: ACTIVE | Noted: 2022-06-16

## 2022-07-27 PROBLEM — K21.9 GASTROESOPHAGEAL REFLUX DISEASE: Status: ACTIVE | Noted: 2022-06-16

## 2022-07-27 PROBLEM — T16.9XXA FOREIGN BODY IN EAR: Status: ACTIVE | Noted: 2017-07-31

## 2022-07-27 PROBLEM — R53.83 FATIGUE: Status: ACTIVE | Noted: 2017-01-02

## 2022-07-27 PROBLEM — H65.199 ACUTE TRANSUDATIVE OTITIS MEDIA: Status: ACTIVE | Noted: 2017-07-21

## 2022-07-27 PROCEDURE — 93000 ELECTROCARDIOGRAM COMPLETE: CPT | Performed by: INTERNAL MEDICINE

## 2022-07-27 PROCEDURE — 99204 OFFICE O/P NEW MOD 45 MIN: CPT | Performed by: INTERNAL MEDICINE

## 2022-07-27 RX ORDER — LISINOPRIL 20 MG/1
20 TABLET ORAL 2 TIMES DAILY
Qty: 180 TABLET | Refills: 4 | Status: SHIPPED | OUTPATIENT
Start: 2022-07-27

## 2022-07-27 RX ORDER — SPIRONOLACTONE 25 MG/1
1 TABLET ORAL DAILY
COMMUNITY
Start: 2022-07-01 | End: 2022-07-27 | Stop reason: SDUPTHER

## 2022-07-27 RX ORDER — METOPROLOL SUCCINATE 50 MG/1
1 TABLET, EXTENDED RELEASE ORAL DAILY
COMMUNITY
Start: 2022-03-31 | End: 2022-07-27 | Stop reason: SDUPTHER

## 2022-07-27 RX ORDER — CHLORTHALIDONE 25 MG/1
25 TABLET ORAL DAILY
Qty: 30 TABLET | Refills: 11 | Status: SHIPPED | OUTPATIENT
Start: 2022-07-27

## 2022-07-27 RX ORDER — ESOMEPRAZOLE MAGNESIUM 40 MG/1
1 CAPSULE, DELAYED RELEASE ORAL DAILY
COMMUNITY
Start: 2022-07-05 | End: 2022-07-27 | Stop reason: SDUPTHER

## 2022-07-27 RX ORDER — LISINOPRIL 10 MG/1
1 TABLET ORAL 2 TIMES DAILY
COMMUNITY
Start: 2022-03-31 | End: 2022-07-27 | Stop reason: SDUPTHER

## 2022-07-27 NOTE — PROGRESS NOTES
"    Subjective:     Encounter Date:07/27/2022      Patient ID: Baldemar Schmitt is a 36 y.o. male.    Chief Complaint: Shortness of breath  HPI  This is a 36-year-old male patient who presents to cardiology clinic for preoperative cardiovascular risk assessment prior to bariatric surgery.  The patient has \"super\" morbid obesity with body mass index greater than 61.  He has a history of paroxysmal atrial fibrillation with apparent outpatient successful synchronized cardioversion at least once in the past.  He has not had any known recurrent atrial fibrillation.  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  None of these diagnoses are substantiated as he was previously under the care of Dr. Gracia who no longer works in [facility].  The patient has no explanation why he was not referred back to Dr. Gracia who is aware of his prior cardiac history.  He reports having shortness of breath both at rest and with activity.  He has had no orthopnea or PND.  He has prominent bilateral lower extremity edema.  He is a non-smoker.  He has never had any prior surgical procedures.  The following portions of the patient's history were reviewed and updated as appropriate: allergies, current medications, past family history, past medical history, past social history, past surgical history and problem  Review of Systems   Constitutional: Negative for chills, diaphoresis, fever, malaise/fatigue, weight gain and weight loss.   HENT: Negative for ear discharge, hearing loss, hoarse voice and nosebleeds.    Eyes: Negative for discharge, double vision, pain and photophobia.   Cardiovascular: Positive for dyspnea on exertion. Negative for chest pain, claudication, cyanosis, irregular heartbeat, leg swelling, near-syncope, orthopnea, palpitations, paroxysmal nocturnal dyspnea and syncope.   Respiratory: " Positive for shortness of breath. Negative for cough, hemoptysis, sputum production and wheezing.    Endocrine: Negative for cold intolerance, heat intolerance, polydipsia, polyphagia and polyuria.   Hematologic/Lymphatic: Negative for adenopathy and bleeding problem. Does not bruise/bleed easily.   Skin: Negative for color change, flushing, itching and rash.   Musculoskeletal: Negative for muscle cramps, muscle weakness, myalgias and stiffness.   Gastrointestinal: Negative for abdominal pain, diarrhea, hematemesis, hematochezia, nausea and vomiting.   Genitourinary: Negative for dysuria, frequency and nocturia.   Neurological: Negative for focal weakness, loss of balance, numbness, paresthesias and seizures.   Psychiatric/Behavioral: Negative for altered mental status, hallucinations and suicidal ideas.   Allergic/Immunologic: Negative for HIV exposure, hives and persistent infections.           Current Outpatient Medications:   •  esomeprazole (nexIUM) 40 MG capsule, Take 40 mg by mouth Every Morning Before Breakfast., Disp: , Rfl:   •  metoprolol succinate XL (TOPROL-XL) 50 MG 24 hr tablet, Take 1 tablet by mouth Daily., Disp: 90 tablet, Rfl: 3  •  spironolactone (ALDACTONE) 25 MG tablet, Take 1 tablet by mouth Daily., Disp: 90 tablet, Rfl: 3  •  allopurinol (ZYLOPRIM) 100 MG tablet, Take 100 mg by mouth 2 (Two) Times a Day., Disp: , Rfl: 2  •  chlorthalidone (HYGROTON) 25 MG tablet, Take 1 tablet by mouth Daily., Disp: 30 tablet, Rfl: 11  •  lisinopril (PRINIVIL,ZESTRIL) 20 MG tablet, Take 1 tablet by mouth 2 (Two) Times a Day., Disp: 180 tablet, Rfl: 4    Objective:   Vitals and nursing note reviewed.   Constitutional:       Appearance: Healthy appearance. Not in distress.   Neck:      Vascular: No JVR. JVD normal.   Pulmonary:      Effort: Pulmonary effort is normal.      Breath sounds: Normal breath sounds. No wheezing. No rhonchi. No rales.   Chest:      Chest wall: Not tender to palpatation.  "  Cardiovascular:      PMI at left midclavicular line. Normal rate. Regular rhythm. Normal S1. Normal S2.      Murmurs: There is no murmur.      No gallop. No click. No rub.   Pulses:     Intact distal pulses.   Edema:     Peripheral edema absent.   Abdominal:      General: Bowel sounds are normal.      Palpations: Abdomen is soft.      Tenderness: There is no abdominal tenderness.   Musculoskeletal: Normal range of motion.         General: No tenderness. Skin:     General: Skin is warm and dry.   Neurological:      General: No focal deficit present.      Mental Status: Alert and oriented to person, place and time.       Blood pressure 144/88, pulse 94, height 188 cm (74\"), weight (!) 218 kg (480 lb), SpO2 98 %.   Lab Review:     Assessment:       1. Shortness of breath on exertion  Multifactorial.  Shortness of breath with activity that is relieved with rest is consistent with the diagnosis of angina pectoris, although there are multiple other potential explanations.  He has multiple risk factors for coronary artery disease.  He is unable to do treadmill exercise stress testing due to his morbid obesity and severe aerobic deconditioning.  - Stress Test With Pet Myocardial Perfusion (MULTI STUDY, REST AND STRESS)  - Adult Transthoracic Echo Complete W/ Cont if Necessary Per Protocol    2. Paroxysmal atrial fibrillation (HCC)  Unsubstantiated diagnosis.  No evidence of recurrence.  - Adult Transthoracic Echo Complete W/ Cont if Necessary Per Protocol    3. Mitral valve prolapse, aortic enlargement, skin and skeletal findings (MASS) syndrome  Unsubstantiated diagnosis.  The patient does not have the typical auscultatory findings of mitral valve prolapse on cardiac auscultation.  - Adult Transthoracic Echo Complete W/ Cont if Necessary Per Protocol  - CT Chest Without Contrast    4. Morbid obesity with BMI of 60.0-69.9, adult (HCC)  The patient has moved into the category of \"super\" morbid obesity with body mass index " "just under 62.  The obesity pattern is central.  This is due to excess calorie intake.  There is evidence of multiple comorbidities.  - Stress Test With Pet Myocardial Perfusion (MULTI STUDY, REST AND STRESS)    5. Primary hypertension  Poor blood pressure control.  - Adult Transthoracic Echo Complete W/ Cont if Necessary Per Protocol    6. Cardiomegaly  The patient was told by his previous cardiologist that he had a \"enlarged heart\".  He also reports being told by his previous cardiologist that untreated sleep apnea has damaged the right side of his heart.  This is all unsubstantiated as no records are available from his previous cardiologist.  - Adult Transthoracic Echo Complete W/ Cont if Necessary Per Protocol    7. Pre-operative cardiovascular examination  I would recommend postponing all elective surgery until further cardiac investigation can be performed.  - Stress Test With Pet Myocardial Perfusion (MULTI STUDY, REST AND STRESS)      ECG 12 Lead    Date/Time: 7/27/2022 11:45 AM  Performed by: Tony Vazquez MD  Authorized by: Tony Vazquez MD   Comparison: compared with previous ECG   Similar to previous ECG  Rhythm: sinus rhythm  Rate: normal  QRS axis: normal    Clinical impression: normal ECG            Plan:     I have recommended a noncontrasted CT scan of the chest to screen for aortic enlargement.  I have recommended an echocardiogram.  I have recommended a vasodilator positron emission tomography myocardial perfusion scan to investigate dyspnea with exertion as a possible angina equivalent as this is the only means of noninvasive ischemic testing which offers any reasonable hope of diagnostic accuracy given his body habitus.  I have recommended increasing his lisinopril to 20 mg orally twice daily.  I have recommended adding chlorthalidone 25 mg orally every morning to his spironolactone.  Further recommendations will be predicated on the results of his outpatient testing.  A formal " statement regarding his surgical operative risk will be made in light of the results of his outpatient testing.